# Patient Record
Sex: MALE | URBAN - METROPOLITAN AREA
[De-identification: names, ages, dates, MRNs, and addresses within clinical notes are randomized per-mention and may not be internally consistent; named-entity substitution may affect disease eponyms.]

---

## 2022-06-27 ENCOUNTER — OFFICE VISIT (OUTPATIENT)
Dept: URBAN - METROPOLITAN AREA CLINIC 3 | Facility: CLINIC | Age: 37
End: 2022-06-27
Payer: COMMERCIAL

## 2022-06-27 DIAGNOSIS — B30.1 CONJUNCTIVITIS DUE TO ADENOVIRUS: Primary | ICD-10-CM

## 2022-06-27 PROCEDURE — 92002 INTRM OPH EXAM NEW PATIENT: CPT | Performed by: OPHTHALMOLOGY

## 2022-06-27 RX ORDER — PREDNISOLONE ACETATE 10 MG/ML
1 % SUSPENSION/ DROPS OPHTHALMIC
Qty: 10 | Refills: 0 | Status: ACTIVE
Start: 2022-06-27

## 2022-06-27 ASSESSMENT — INTRAOCULAR PRESSURE
OS: 22
OD: 20

## 2022-06-27 NOTE — IMPRESSION/PLAN
Impression: Conjunctivitis due to adenovirus: B30.1. Plan: C/o pain and light sensitivity OU, has evidence of recent adenoviral keratitis, no evidence of HSV. Start taper for Prednisolone 1% gtt OU QID the first week, TID the second week, BID the third week, QD the fourth week.  RTC in 1 month if symptoms persist.